# Patient Record
Sex: FEMALE | Race: OTHER | URBAN - METROPOLITAN AREA
[De-identification: names, ages, dates, MRNs, and addresses within clinical notes are randomized per-mention and may not be internally consistent; named-entity substitution may affect disease eponyms.]

---

## 2020-02-26 ENCOUNTER — IMPORTED ENCOUNTER (OUTPATIENT)
Dept: URBAN - METROPOLITAN AREA CLINIC 50 | Facility: CLINIC | Age: 50
End: 2020-02-26

## 2020-06-03 ENCOUNTER — IMPORTED ENCOUNTER (OUTPATIENT)
Dept: URBAN - METROPOLITAN AREA CLINIC 50 | Facility: CLINIC | Age: 50
End: 2020-06-03

## 2020-06-03 NOTE — PATIENT DISCUSSION
"""Significant vision loss OD/OS x 3 years per patient. BCVA 20/400 OD/OS.  [facility] today shows ""

## 2020-09-22 ENCOUNTER — IMPORTED ENCOUNTER (OUTPATIENT)
Dept: URBAN - METROPOLITAN AREA CLINIC 50 | Facility: CLINIC | Age: 50
End: 2020-09-22

## 2021-04-17 ASSESSMENT — TONOMETRY
OS_IOP_MMHG: 15
OD_IOP_MMHG: 19
OD_IOP_MMHG: 16
OS_IOP_MMHG: 19

## 2021-04-17 ASSESSMENT — VISUAL ACUITY
OS_CC: 20/400
OS_BAT: >20/400
OD_CC: 20/400
OD_CC: 20/400
OS_PH: 20/200-1
OD_OTHER: >20/400.
OD_BAT: >20/400
OS_CC: J7
OD_CC: J7
OS_CC: CF@2FT
OS_OTHER: >20/400.